# Patient Record
Sex: FEMALE | Race: WHITE | NOT HISPANIC OR LATINO | Employment: FULL TIME | URBAN - METROPOLITAN AREA
[De-identification: names, ages, dates, MRNs, and addresses within clinical notes are randomized per-mention and may not be internally consistent; named-entity substitution may affect disease eponyms.]

---

## 2023-08-07 ENCOUNTER — LAB (OUTPATIENT)
Dept: LAB | Facility: LAB | Age: 23
End: 2023-08-07

## 2023-08-07 ENCOUNTER — OFFICE VISIT (OUTPATIENT)
Dept: PRIMARY CARE | Facility: CLINIC | Age: 23
End: 2023-08-07

## 2023-08-07 VITALS
OXYGEN SATURATION: 97 % | HEART RATE: 82 BPM | SYSTOLIC BLOOD PRESSURE: 122 MMHG | WEIGHT: 148.4 LBS | BODY MASS INDEX: 27.31 KG/M2 | DIASTOLIC BLOOD PRESSURE: 74 MMHG | HEIGHT: 62 IN

## 2023-08-07 DIAGNOSIS — F43.23 ACUTE ADJUSTMENT DISORDER WITH MIXED ANXIETY AND DEPRESSED MOOD: ICD-10-CM

## 2023-08-07 DIAGNOSIS — F43.23 ACUTE ADJUSTMENT DISORDER WITH MIXED ANXIETY AND DEPRESSED MOOD: Primary | ICD-10-CM

## 2023-08-07 LAB
ALANINE AMINOTRANSFERASE (SGPT) (U/L) IN SER/PLAS: 19 U/L (ref 7–45)
ALBUMIN (G/DL) IN SER/PLAS: 4.8 G/DL (ref 3.4–5)
ALKALINE PHOSPHATASE (U/L) IN SER/PLAS: 62 U/L (ref 33–110)
ANION GAP IN SER/PLAS: 12 MMOL/L (ref 10–20)
ASPARTATE AMINOTRANSFERASE (SGOT) (U/L) IN SER/PLAS: 17 U/L (ref 9–39)
BILIRUBIN TOTAL (MG/DL) IN SER/PLAS: 0.7 MG/DL (ref 0–1.2)
CALCIUM (MG/DL) IN SER/PLAS: 9.4 MG/DL (ref 8.6–10.3)
CARBON DIOXIDE, TOTAL (MMOL/L) IN SER/PLAS: 29 MMOL/L (ref 21–32)
CHLORIDE (MMOL/L) IN SER/PLAS: 101 MMOL/L (ref 98–107)
CREATININE (MG/DL) IN SER/PLAS: 0.77 MG/DL (ref 0.5–1.05)
GFR FEMALE: >90 ML/MIN/1.73M2
GLUCOSE (MG/DL) IN SER/PLAS: 90 MG/DL (ref 74–99)
POTASSIUM (MMOL/L) IN SER/PLAS: 3.9 MMOL/L (ref 3.5–5.3)
PROTEIN TOTAL: 8.1 G/DL (ref 6.4–8.2)
SODIUM (MMOL/L) IN SER/PLAS: 138 MMOL/L (ref 136–145)
THYROTROPIN (MIU/L) IN SER/PLAS BY DETECTION LIMIT <= 0.05 MIU/L: 2.97 MIU/L (ref 0.44–3.98)
UREA NITROGEN (MG/DL) IN SER/PLAS: 10 MG/DL (ref 6–23)

## 2023-08-07 PROCEDURE — 84443 ASSAY THYROID STIM HORMONE: CPT

## 2023-08-07 PROCEDURE — 36415 COLL VENOUS BLD VENIPUNCTURE: CPT

## 2023-08-07 PROCEDURE — 82306 VITAMIN D 25 HYDROXY: CPT

## 2023-08-07 PROCEDURE — 82607 VITAMIN B-12: CPT

## 2023-08-07 PROCEDURE — 1036F TOBACCO NON-USER: CPT | Performed by: NURSE PRACTITIONER

## 2023-08-07 PROCEDURE — 99203 OFFICE O/P NEW LOW 30 MIN: CPT | Performed by: NURSE PRACTITIONER

## 2023-08-07 PROCEDURE — 80053 COMPREHEN METABOLIC PANEL: CPT

## 2023-08-07 RX ORDER — SERTRALINE HYDROCHLORIDE 25 MG/1
25 TABLET, FILM COATED ORAL DAILY
Qty: 90 TABLET | Refills: 0 | Status: SHIPPED | OUTPATIENT
Start: 2023-08-07 | End: 2023-09-18 | Stop reason: SDUPTHER

## 2023-08-07 SDOH — SOCIAL STABILITY: SOCIAL INSECURITY: RISK FACTORS: FAMILY HISTORY

## 2023-08-07 ASSESSMENT — ENCOUNTER SYMPTOMS
CONSTIPATION: 1
THOUGHTS THAT DEATH WOULD BE EASIER: 1
RESPIRATORY NEGATIVE: 1
NERVOUS/ANXIOUS: 1
CARDIOVASCULAR NEGATIVE: 1
SLEEP QUALITY: NON-RESTORATIVE
NIGHTTIME AWAKENINGS: ONE TO TWO
IRRITABILITY: 1
DEPRESSION: 1
DIARRHEA: 1
INSOMNIA: 1
ANHEDONIA: 1

## 2023-08-07 ASSESSMENT — ANXIETY QUESTIONNAIRES
GAD7 TOTAL SCORE: 18
7. FEELING AFRAID AS IF SOMETHING AWFUL MIGHT HAPPEN: NEARLY EVERY DAY
6. BECOMING EASILY ANNOYED OR IRRITABLE: NEARLY EVERY DAY
1. FEELING NERVOUS, ANXIOUS, OR ON EDGE: NEARLY EVERY DAY
4. TROUBLE RELAXING: MORE THAN HALF THE DAYS
3. WORRYING TOO MUCH ABOUT DIFFERENT THINGS: NEARLY EVERY DAY
2. NOT BEING ABLE TO STOP OR CONTROL WORRYING: MORE THAN HALF THE DAYS
5. BEING SO RESTLESS THAT IT IS HARD TO SIT STILL: MORE THAN HALF THE DAYS
IF YOU CHECKED OFF ANY PROBLEMS ON THIS QUESTIONNAIRE, HOW DIFFICULT HAVE THESE PROBLEMS MADE IT FOR YOU TO DO YOUR WORK, TAKE CARE OF THINGS AT HOME, OR GET ALONG WITH OTHER PEOPLE: SOMEWHAT DIFFICULT

## 2023-08-07 ASSESSMENT — PATIENT HEALTH QUESTIONNAIRE - PHQ9
10. IF YOU CHECKED OFF ANY PROBLEMS, HOW DIFFICULT HAVE THESE PROBLEMS MADE IT FOR YOU TO DO YOUR WORK, TAKE CARE OF THINGS AT HOME, OR GET ALONG WITH OTHER PEOPLE: SOMEWHAT DIFFICULT
1. LITTLE INTEREST OR PLEASURE IN DOING THINGS: NEARLY EVERY DAY
9. THOUGHTS THAT YOU WOULD BE BETTER OFF DEAD, OR OF HURTING YOURSELF: NEARLY EVERY DAY
SUM OF ALL RESPONSES TO PHQ9 QUESTIONS 1 AND 2: 6
6. FEELING BAD ABOUT YOURSELF - OR THAT YOU ARE A FAILURE OR HAVE LET YOURSELF OR YOUR FAMILY DOWN: NEARLY EVERY DAY
8. MOVING OR SPEAKING SO SLOWLY THAT OTHER PEOPLE COULD HAVE NOTICED. OR THE OPPOSITE, BEING SO FIGETY OR RESTLESS THAT YOU HAVE BEEN MOVING AROUND A LOT MORE THAN USUAL: SEVERAL DAYS
2. FEELING DOWN, DEPRESSED OR HOPELESS: NEARLY EVERY DAY
1. LITTLE INTEREST OR PLEASURE IN DOING THINGS: NEARLY EVERY DAY
3. TROUBLE FALLING OR STAYING ASLEEP OR SLEEPING TOO MUCH: SEVERAL DAYS
7. TROUBLE CONCENTRATING ON THINGS, SUCH AS READING THE NEWSPAPER OR WATCHING TELEVISION: SEVERAL DAYS
5. POOR APPETITE OR OVEREATING: MORE THAN HALF THE DAYS
4. FEELING TIRED OR HAVING LITTLE ENERGY: MORE THAN HALF THE DAYS
SUM OF ALL RESPONSES TO PHQ QUESTIONS 1-9: 19
SUM OF ALL RESPONSES TO PHQ9 QUESTIONS 1 AND 2: 6
2. FEELING DOWN, DEPRESSED OR HOPELESS: NEARLY EVERY DAY

## 2023-08-07 ASSESSMENT — COLUMBIA-SUICIDE SEVERITY RATING SCALE - C-SSRS
2. HAVE YOU ACTUALLY HAD ANY THOUGHTS OF KILLING YOURSELF?: NO
6. HAVE YOU EVER DONE ANYTHING, STARTED TO DO ANYTHING, OR PREPARED TO DO ANYTHING TO END YOUR LIFE?: NO
1. IN THE PAST MONTH, HAVE YOU WISHED YOU WERE DEAD OR WISHED YOU COULD GO TO SLEEP AND NOT WAKE UP?: YES

## 2023-08-07 ASSESSMENT — LIFESTYLE VARIABLES: SUBSTANCE_ABUSE: 0

## 2023-08-07 NOTE — PROGRESS NOTES
"Subjective   Patient ID: Twila Khoury is a 22 y.o. female who presents for Establish Care and Depression.    Depression  Visit Type: initial  Onset of symptoms: more than 6 months ago  Progression since onset: gradually worsening  Patient presents with the following symptoms: anhedonia, insomnia, irritability, nervousness/anxiety, suicidal ideas, suicidal planning and thoughts of death.  Frequency of symptoms: most days   Severity: interfering with daily activities   Aggravated by: family issues, social activities and work stress  Sleep quality: non-restorative  Nighttime awakenings: one to two  Risk factors: family history  No history of: anemia, anxiety/panic attacks, arrhythmia, asthma, bipolar disorder, CAD, CHF, chronic lung disease, depression, fibromyalgia, hyperthyroidism, suicide attempt, mental illness and substance abuse  Treatment tried: nothing         Review of Systems   Constitutional:  Positive for irritability.   Respiratory: Negative.     Cardiovascular: Negative.    Gastrointestinal:  Positive for constipation and diarrhea.   Genitourinary: Negative.    Psychiatric/Behavioral:  Positive for depression and suicidal ideas. Negative for substance abuse. The patient is nervous/anxious and has insomnia.        Objective   /74   Pulse 82   Ht 1.582 m (5' 2.3\")   Wt 67.3 kg (148 lb 6.4 oz)   LMP 07/24/2023 (Approximate)   SpO2 97%   BMI 26.88 kg/m²     Physical Exam  Constitutional:       Appearance: Normal appearance.   Cardiovascular:      Rate and Rhythm: Normal rate.      Heart sounds: Normal heart sounds.   Pulmonary:      Effort: Pulmonary effort is normal.      Breath sounds: Normal breath sounds.   Skin:     Capillary Refill: Capillary refill takes less than 2 seconds.   Neurological:      General: No focal deficit present.      Mental Status: She is alert and oriented to person, place, and time.   Psychiatric:         Attention and Perception: Attention normal.         Mood and " Affect: Mood is depressed. Affect is flat.         Speech: Speech normal.         Behavior: Behavior is slowed.         Cognition and Memory: Cognition normal.         Judgment: Judgment normal.      Comments: Patient currently denies that she is suicidal.  She states in the past however she has had thoughts of harming herself.  States she does have a plan as she would use a gun.  She does have access to guns.         Assessment/Plan   Problem List Items Addressed This Visit    None  Visit Diagnoses       Acute adjustment disorder with mixed anxiety and depressed mood    -  Primary    Relevant Medications    sertraline (Zoloft) 25 mg tablet    Other Relevant Orders    Tsh With Reflex To Free T4 If Abnormal    Comprehensive metabolic panel    Vitamin B12 level    Vitamin D 25-Hydroxy,Total          Reviewed with patient extremely concerned as she is 22 years old we will start on Zoloft.  Advised her to check with her mom who also has history of depression what medication she is on as that has worked well for her.  Explained importance of routine exercise maintaining adequate sleep.  Also will check some routine blood work.  Patient to follow-up closely within 4 to 6 weeks

## 2023-08-07 NOTE — PATIENT INSTRUCTIONS
"Depression:  Understanding Depression:Depression is a mood disorder that causes a persistent feeling of sadness, loss of interest, and can lead to a variety of emotional and physical problems.  It's more than just feeling \"down\" - it's a systemic issue that affects how you think, feel, and function in daily activities.    Suicide:  Understanding Suicide:Suicidal thoughts or behaviors are a sign of severe distress and an indication for immediate intervention.  It's essential to take all signs or threats of suicide seriously.-You are having these thoughts it is very important you call 911/go to the ER  a [employer] National Suicide Prevention Lifeline is 8-211-354-TALK (1-597.447.3070).    Discussed Melody who is a behavioral health manager.  She can be very beneficial as you will see to the information provided cognitive behavioral therapy is very important with the overall treatment of anxiety and depression.  If you change your mind and would like to speak with her I be happy to place the referral.    Anxiety can be challenging to manage but there are various strategies and techniques you can utilize to control it. Here's some patient information that could be helpful:    Understanding Anxiety: Anxiety is a normal response to stress. But for some people, it becomes an overwhelming, recurring problem that interferes with daily life. Conditions like generalized anxiety disorder, panic disorder, social anxiety disorder, and various phobias are common types of anxiety disorders.    Identify Triggers: Understanding what triggers your anxiety can be helpful. These can be certain situations, people, places, or events that increase your anxiety levels.    Breathing Exercises: Deep breathing can help to reduce anxiety. There are several different techniques, but generally, the idea is to focus your attention on your breath, breathing deeply and slowly.    Physical Exercise: Regular physical exercise can help reduce " anxiety by boosting your mood and acting as a natural stress reducer.    Mindful Meditation: Techniques like mindfulness and meditation can help you stay focused on the present moment and can be very helpful in reducing anxiety.    Healthy Lifestyle: Maintaining a balanced diet, getting enough sleep, and reducing caffeine and alcohol can also help manage your anxiety levels.    Cognitive Behavioral Therapy (CBT): CBT is a form of psychological treatment that has been demonstrated to be effective for a range of problems including anxiety. It helps you understand the thoughts and feelings that influence behaviors.    Medication: Certain medications can be used to treat anxiety, including SSRIs, SNRIs, and benzodiazepines. These should only be used under the supervision of a healthcare professional.

## 2023-08-08 LAB
CALCIDIOL (25 OH VITAMIN D3) (NG/ML) IN SER/PLAS: 23 NG/ML
COBALAMIN (VITAMIN B12) (PG/ML) IN SER/PLAS: 636 PG/ML (ref 211–911)

## 2023-08-12 ENCOUNTER — TELEPHONE (OUTPATIENT)
Dept: PRIMARY CARE | Facility: CLINIC | Age: 23
End: 2023-08-12

## 2023-08-12 NOTE — TELEPHONE ENCOUNTER
Result Communication    Resulted Orders   Tsh With Reflex To Free T4 If Abnormal   Result Value Ref Range    TSH 2.97 0.44 - 3.98 mIU/L      Comment:       TSH testing is performed using different testing    methodology at [facility] than at other    [facility] hospitals. Direct result comparisons should    only be made within the same method.   Comprehensive metabolic panel   Result Value Ref Range    Glucose 90 74 - 99 mg/dL    Sodium 138 136 - 145 mmol/L    Potassium 3.9 3.5 - 5.3 mmol/L    Chloride 101 98 - 107 mmol/L    Bicarbonate 29 21 - 32 mmol/L    Anion Gap 12 10 - 20 mmol/L    Urea Nitrogen 10 6 - 23 mg/dL    Creatinine 0.77 0.50 - 1.05 mg/dL    GFR Female >90 >90 mL/min/1.73m2      Comment:       CALCULATIONS OF ESTIMATED GFR ARE PERFORMED   USING THE 2021 CKD-EPI STUDY REFIT EQUATION   WITHOUT THE RACE VARIABLE FOR THE IDMS-TRACEABLE   CREATININE METHODS.    https://jasn.asnjournals.org/content/early/2021/09/22/ASN.6660189603    Calcium 9.4 8.6 - 10.3 mg/dL    Albumin 4.8 3.4 - 5.0 g/dL    Alkaline Phosphatase 62 33 - 110 U/L    Total Protein 8.1 6.4 - 8.2 g/dL    AST 17 9 - 39 U/L    Total Bilirubin 0.7 0.0 - 1.2 mg/dL    ALT (SGPT) 19 7 - 45 U/L      Comment:       Patients treated with Sulfasalazine may generate    falsely decreased results for ALT.   Vitamin B12 level   Result Value Ref Range    Vitamin B-12 636 211 - 911 pg/mL   Vitamin D 25-Hydroxy,Total   Result Value Ref Range    Vitamin D, 25-Hydroxy 23 (A) ng/mL      Comment:      .  DEFICIENCY:         < 20   NG/ML  INSUFFICIENCY:      20-29  NG/ML  SUFFICIENCY:         NG/ML    THIS ASSAY ACCURATELY QUANTIFIES THE SUM OF  VITAMIN D3, 25-HYDROXY AND VIT D2,25-HYDROXY.       11:38 AM      Results were successfully communicated with the mother and they acknowledged their understanding.

## 2023-08-12 NOTE — TELEPHONE ENCOUNTER
----- Message from CINDY Gonzalez-CNP sent at 8/8/2023 10:39 AM EDT -----  Please advise patient her vitamin D level was low at 23.  Would like her to take over-the-counter vitamin D 2000 IUs daily.  Patient's vitamin B12 within normal limits.  And her thyroid within normal limits as well as her CMP.  Vitamin D can definitely affect mood.  She may see some improvement after she has been on the sertraline and vitamin D.  She should have a scheduled follow-up if she does not already in 6 to 8 weeks

## 2023-09-18 ENCOUNTER — OFFICE VISIT (OUTPATIENT)
Dept: PRIMARY CARE | Facility: CLINIC | Age: 23
End: 2023-09-18

## 2023-09-18 VITALS
HEART RATE: 65 BPM | OXYGEN SATURATION: 99 % | DIASTOLIC BLOOD PRESSURE: 64 MMHG | HEIGHT: 62 IN | WEIGHT: 147 LBS | SYSTOLIC BLOOD PRESSURE: 100 MMHG | BODY MASS INDEX: 27.05 KG/M2

## 2023-09-18 DIAGNOSIS — F43.23 ACUTE ADJUSTMENT DISORDER WITH MIXED ANXIETY AND DEPRESSED MOOD: ICD-10-CM

## 2023-09-18 PROCEDURE — 1036F TOBACCO NON-USER: CPT | Performed by: NURSE PRACTITIONER

## 2023-09-18 PROCEDURE — 99213 OFFICE O/P EST LOW 20 MIN: CPT | Performed by: NURSE PRACTITIONER

## 2023-09-18 RX ORDER — SERTRALINE HYDROCHLORIDE 25 MG/1
25 TABLET, FILM COATED ORAL DAILY
Qty: 90 TABLET | Refills: 0 | Status: SHIPPED | OUTPATIENT
Start: 2023-09-18 | End: 2023-12-17

## 2023-09-18 ASSESSMENT — PATIENT HEALTH QUESTIONNAIRE - PHQ9
2. FEELING DOWN, DEPRESSED OR HOPELESS: SEVERAL DAYS
SUM OF ALL RESPONSES TO PHQ9 QUESTIONS 1 AND 2: 2
1. LITTLE INTEREST OR PLEASURE IN DOING THINGS: SEVERAL DAYS

## 2023-09-18 ASSESSMENT — ENCOUNTER SYMPTOMS
MUSCULOSKELETAL NEGATIVE: 1
PSYCHIATRIC NEGATIVE: 1
CONSTITUTIONAL NEGATIVE: 1
RESPIRATORY NEGATIVE: 1
CARDIOVASCULAR NEGATIVE: 1

## 2023-09-18 ASSESSMENT — ANXIETY QUESTIONNAIRES
1. FEELING NERVOUS, ANXIOUS, OR ON EDGE: NOT AT ALL
6. BECOMING EASILY ANNOYED OR IRRITABLE: SEVERAL DAYS
IF YOU CHECKED OFF ANY PROBLEMS ON THIS QUESTIONNAIRE, HOW DIFFICULT HAVE THESE PROBLEMS MADE IT FOR YOU TO DO YOUR WORK, TAKE CARE OF THINGS AT HOME, OR GET ALONG WITH OTHER PEOPLE: NOT DIFFICULT AT ALL
5. BEING SO RESTLESS THAT IT IS HARD TO SIT STILL: NOT AT ALL
GAD7 TOTAL SCORE: 4
3. WORRYING TOO MUCH ABOUT DIFFERENT THINGS: SEVERAL DAYS
4. TROUBLE RELAXING: SEVERAL DAYS
7. FEELING AFRAID AS IF SOMETHING AWFUL MIGHT HAPPEN: NOT AT ALL
2. NOT BEING ABLE TO STOP OR CONTROL WORRYING: SEVERAL DAYS

## 2023-09-18 NOTE — PROGRESS NOTES
"Subjective   Patient ID: Twila Khoury is a 23 y.o. female who presents for Follow-up (Pt is here for a 6 wk follow up since starting the Zoloft; doing well. States she feels as though the medication is working. No side effects.).    HPI     Review of Systems   Constitutional: Negative.    Respiratory: Negative.     Cardiovascular: Negative.    Genitourinary: Negative.    Musculoskeletal: Negative.    Psychiatric/Behavioral: Negative.         Objective   /64   Pulse 65   Ht 1.575 m (5' 2\")   Wt 66.7 kg (147 lb)   SpO2 99%   BMI 26.89 kg/m²     Physical Exam  HENT:      Head: Normocephalic.   Cardiovascular:      Rate and Rhythm: Normal rate and regular rhythm.   Pulmonary:      Effort: Pulmonary effort is normal.   Musculoskeletal:         General: Normal range of motion.   Skin:     General: Skin is warm.      Capillary Refill: Capillary refill takes less than 2 seconds.   Neurological:      General: No focal deficit present.      Mental Status: She is alert and oriented to person, place, and time.   Psychiatric:         Mood and Affect: Mood normal.         Behavior: Behavior normal.         Thought Content: Thought content normal.         Judgment: Judgment normal.         Assessment/Plan   Problem List Items Addressed This Visit    None  Visit Diagnoses       Acute adjustment disorder with mixed anxiety and depressed mood        Relevant Medications    sertraline (Zoloft) 25 mg tablet          Patient with significant improvement on the Zoloft.  We will follow-up in 6 months sooner if needed.  PHQ-9 2 MIKHAIL-7 of 4.     "

## 2023-09-18 NOTE — PATIENT INSTRUCTIONS
"Exercise can have significant benefits for managing and reducing anxiety. Regular physical activity has been shown to positively impact both physical and mental health, including helping to alleviate symptoms of anxiety. Here are some ways exercise can help with anxiety:    Release of Neurotransmitters: Exercise stimulates the release of neurotransmitters such as endorphins, serotonin, and dopamine. These \"feel-good\" chemicals can improve mood and reduce feelings of anxiety.    Stress Reduction: Physical activity can help reduce stress levels by lowering the body's stress hormones like cortisol. It provides an outlet for releasing tension and pent-up energy.    Distraction and Focus: Engaging in exercise can divert your focus away from anxious thoughts and worries, promoting a state of mindfulness and being present in the moment.    Increased Self-Efficacy: Regular exercise can boost self-confidence and self-esteem, making it easier to cope with anxiety-inducing situations.    Better Sleep: Exercise can improve the quality of sleep, which is essential for managing anxiety. Restful sleep helps the body and mind recover from daily stressors.    Social Interaction: Participating in group exercise classes or team sports can provide opportunities for social interaction, which can help combat feelings of isolation and loneliness.    Energy Release: Anxiety can often lead to restlessness and nervous energy. Physical activity provides a healthy outlet for this energy, reducing restlessness and promoting relaxation.    Long-Term Benefits: Consistent exercise over time can lead to better overall physical health, which can indirectly improve mental well-being and resilience to stressors.  "

## 2024-03-18 ENCOUNTER — APPOINTMENT (OUTPATIENT)
Dept: PRIMARY CARE | Facility: CLINIC | Age: 24
End: 2024-03-18